# Patient Record
Sex: FEMALE | Race: WHITE | Employment: FULL TIME | URBAN - METROPOLITAN AREA
[De-identification: names, ages, dates, MRNs, and addresses within clinical notes are randomized per-mention and may not be internally consistent; named-entity substitution may affect disease eponyms.]

---

## 2018-02-12 PROBLEM — L03.114 CELLULITIS OF LEFT HAND: Status: ACTIVE | Noted: 2018-02-12
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
## 2018-02-14 PROBLEM — L03.114 CELLULITIS OF LEFT HAND: Status: RESOLVED | Noted: 2018-02-12 | Resolved: 2018-02-14

## 2018-10-02 ENCOUNTER — OFFICE VISIT (OUTPATIENT)
Dept: ORTHOPEDIC SURGERY | Age: 28
End: 2018-10-02
Payer: COMMERCIAL

## 2018-10-02 VITALS
SYSTOLIC BLOOD PRESSURE: 121 MMHG | WEIGHT: 125 LBS | DIASTOLIC BLOOD PRESSURE: 76 MMHG | HEIGHT: 66 IN | BODY MASS INDEX: 20.09 KG/M2

## 2018-10-02 DIAGNOSIS — M25.562 LEFT KNEE PAIN, UNSPECIFIED CHRONICITY: ICD-10-CM

## 2018-10-02 DIAGNOSIS — E88.89 HOFFA'S SYNDROME (HCC): Primary | ICD-10-CM

## 2018-10-02 DIAGNOSIS — M22.2X2 PATELLOFEMORAL PAIN SYNDROME OF LEFT KNEE: ICD-10-CM

## 2018-10-02 PROCEDURE — 99203 OFFICE O/P NEW LOW 30 MIN: CPT | Performed by: ORTHOPAEDIC SURGERY

## 2018-10-02 RX ORDER — METRONIDAZOLE 375 MG/1
375 CAPSULE ORAL 2 TIMES DAILY
COMMUNITY

## 2018-10-22 ENCOUNTER — HOSPITAL ENCOUNTER (OUTPATIENT)
Dept: PHYSICAL THERAPY | Age: 28
Setting detail: THERAPIES SERIES
Discharge: HOME | End: 2018-10-22
Payer: COMMERCIAL

## 2018-10-22 PROCEDURE — 97161 PT EVAL LOW COMPLEX 20 MIN: CPT | Performed by: PHYSICAL THERAPIST

## 2018-10-22 PROCEDURE — G8978 MOBILITY CURRENT STATUS: HCPCS | Performed by: PHYSICAL THERAPIST

## 2018-10-22 PROCEDURE — 97530 THERAPEUTIC ACTIVITIES: CPT | Performed by: PHYSICAL THERAPIST

## 2018-10-22 PROCEDURE — G8979 MOBILITY GOAL STATUS: HCPCS | Performed by: PHYSICAL THERAPIST

## 2018-10-22 PROCEDURE — 97110 THERAPEUTIC EXERCISES: CPT | Performed by: PHYSICAL THERAPIST

## 2018-10-22 NOTE — FLOWSHEET NOTE
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 ([facility] [address]  · Treatment Diagnosis: M25.562- left knee pain  Insurance/Certification information:  PT Insurance Information: [place]  Physician Information:  Referring Practitioner: Thanh Martinez of care signed (Y/N):     Date of Patient follow up with Physician: prn after 2018    G-Code (if applicable):      Date G-Code Applied:  10/22/2018  PT G-Codes  Functional Assessment Tool Used: LEFS  Score: 75%  Functional Limitation: Mobility: Walking and moving around  Mobility: Walking and Moving Around Current Status ():  At least 20 percent but less than 40 percent impaired, limited or restricted  Mobility: Walking and Moving Around Goal Status (): 0 percent impaired, limited or restricted    Progress Note: [x]  Yes  []  No  Next due by: Visit #10  Or 2018     Latex Allergy:  [x]NO      []YES  Preferred Language for Healthcare:   [x]English       []other:    Visit # Insurance Allowable   1 30     Pain level:  See eval     SUBJECTIVE:  See eval    OBJECTIVE: See eval  Observation:   Test measurements:      Flexibility L R Comment   Hamstring      Gastroc      ITB      Quad                ROM PROM AROM Overpressure Comment    L R L R L R    Flexion          Extension                                  Strength L R Comment   Quad      Hamstring      Gastroc      Hip flexor      Hip ABD                      Special Test Results/Comment   Meniscal Click    Crepitus    Flexion Test    Valgus Laxity    Varus Laxity    Lachmans    Drop Back    Homans Hold pending MD visit [] Discharge    Electronically signed by: JAELYN Ortiz [id number]

## 2018-10-22 NOTE — PLAN OF CARE
Relevant Medical History: Chrons, asthma- EIA uses inhaler, hernia- managed  Functional Disability Index:  LEFS    Pain Scale: 2/10  Easing factors: KT tape, tried orthotics, ibf, ice with some relief. Used an old brace with soccer, which helped. Pain at best- 0/10  Provocative factors: soccer, running, hiking, walking around classroom. Pain at worst-8-9/10    Type: []Constant   [x]Intermittent  []Radiating []Localized []other:     Numbness/Tingling:  MTHFR- blood disorder. Possibly some from this. 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 Trains with a friend  Pace: recovery run 10:10, 5K race pace 8:00  Training Schedule: did full Pig in May, walked [facility] due to injury  Terrain: hilly, avoids treadmill  [place]itel training: class on Monday night-cardio with strengthening, yoga, Pilates, cardio at the gym ([employer]), lifting  Upcoming Events: holding per MD visit  Footwear:  Did foot ID at [employer]. She got insoles as her left foot pronates more. She got pain around the time too. Normal Menstrual Cycles: [] yes   [x] no   [] N/A    Objective Data:    Joint mobility:    [x]Normal    []Hypo   []Hyper    Palpation: TTP over patellar tendon, tibial tuberosity, proximal ITB, -on jt line.

## 2018-10-24 ENCOUNTER — HOSPITAL ENCOUNTER (OUTPATIENT)
Dept: PHYSICAL THERAPY | Age: 28
Setting detail: THERAPIES SERIES
Discharge: HOME | End: 2018-10-24
Payer: COMMERCIAL

## 2018-10-24 PROCEDURE — 97110 THERAPEUTIC EXERCISES: CPT | Performed by: PHYSICAL THERAPIST

## 2018-10-24 PROCEDURE — 97530 THERAPEUTIC ACTIVITIES: CPT | Performed by: PHYSICAL THERAPIST

## 2018-10-24 NOTE — FLOWSHEET NOTE
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 ([facility] [address]  · Treatment Diagnosis: M25.562- left knee pain  Insurance/Certification information:  PT Insurance Information: [place]  Physician Information:  Referring Practitioner: Edna Weeks of care signed (Y/N):     Date of Patient follow up with Physician: prn after 2018    G-Code (if applicable):      Date G-Code Applied:  10/22/2018       Progress Note: [x]  Yes  []  No  Next due by: Visit #10  Or 2018     Latex Allergy:  [x]NO      []YES  Preferred Language for Healthcare:   [x]English       []other:    Visit # Insurance Allowable   2 30     Pain level:  1/10     SUBJECTIVE:  Her HEP is going well. She felt she was tired after doing the exercises twice a day. She feels fatigued with this. She does still get some tingling in her toes with ITB stretching.       OBJECTIVE: See eval  Observation:   Test measurements:      Flexibility L R Comment   Hamstring      Gastroc      ITB      Quad                ROM PROM AROM Overpressure Comment    L R L R L R    Flexion          Extension                                  Strength L R Comment   Quad      Hamstring      Gastroc      Hip flexor      Hip ABD                      Special Test Results/Comment   Meniscal Click    Crepitus    Flexion Test    Valgus Laxity    Varus Laxity    Lachmans    Drop Back    Homans            Girth L R   Mid Patella     Suprapatellar     5cm above     15cm above       Wells Clinical Decision Rule for DVT    Clinical Presentation  Possible Score  Clients Score    Active cancer (within 6 months of diagnosis or receiving palliative care)  1     Paralysis, paresis, or recent immobilization of lower extremity  1     Bedridden for more than 3 days or major surgery in the last 4 weeks  1     Localized tenderness in the center of the posterior calf, the popliteal space, or along the femoral vein in the anterior thigh/groin  1     Entire lower extremity swelling  1     Unilateral calf swelling (more than 3 cm larger than uninvolved side)  1     Unilateral pitting edema  1     Collateral superficial veins (nonvaricose)  1     An alternative diagnosis is as likely (or more likely) than DVT (e.g., cellulitis, postoperative swelling, calf strain)  -2     Total Points   -2     Key  · -2 to 0 Low probability of DVT 3% (95% confidence interval [CI] 1.7%-5.9%)  · 1 to 2 Moderate probability of DVT 17% (95% confidence interval [CI] 12%-23%)  · 3 or more High probability of DVT 75% (95% confidence interval [CI] 63%-84%)    Medical consultation is advised in the presence of low probability  Medical referral is required with moderate or high score. Angel PS, Hector DR, Jyothi J, et al: Value of assessment of pretest probability of deep-vein thrombosis in clinical management, Lancet 991:5280-8000, 1997.       RESTRICTIONS/PRECAUTIONS: Chrons, asthma- EIA uses inhaler, hernia- managed; MTHFR- blood disorder     Exercises/Interventions:     Exercise/Equipment Resistance/Repetitions Other comments   Stretching     Hamstring 4x:30 Stopped early due to paresthesias in foot   Hip Flexion 5x:30 Off table   ITB 5x:30 Cross body   Grion     Quad     Inclined Calf     Towel Pull     Foam roll over ITB 2' to pt trevon On less dense roller             SLR     Supine 3x10    Prone     Abduction 3x10    Adducton 3x10    SLR+ 3x:15    Clams 3x10 4#         Hip ABD circuit 3x  :30 hold  30x pumps         Isometrics     Quad sets     Hip Adduction     Hamstring                    Patellar Glides     Medial Superior     Inferior          ROM     Sheet Pulls     Wall Slides     Edge of bed     Flexionator     Extensionator     Hang Weights     Ankle Pumps                              CKC     Calf raises     Wall sits 5x:30     Step ups     Step up and over     Lateral Step Downs               Mini squats     CC TKE          Lateral band walks 3 laps red    Side Planks     Half moon     Single leg flow          Biodex-balance     Single leg stance     Plyoback          Stool scoots     SB bridge     SB HS Curls     Planks          PRE     Extension 3x10 30# ECL RANGE: 90/40   Flexion  RANGE: 0/90        Cable Column          Leg Press 3x10 80# ECL RANGE: 70/10        Bike     Treadmill                     Therapeutic Exercise and NMR EXR  [x] (36432) Provided verbal/tactile cueing for activities related to strengthening, flexibility, endurance, ROM for improvements in LE, proximal hip, and core control with self care, mobility, lifting, ambulation.  [] (67664) Provided verbal/tactile cueing for activities related to improving balance, coordination, kinesthetic sense, posture, motor skill, proprioception  to assist with LE, proximal hip, and core control in self care, mobility, lifting, ambulation and eccentric single leg control.      NMR and Therapeutic Activities:    [x] (79475 or 21640) Provided verbal/tactile cueing for activities related to improving balance, coordination, kinesthetic sense, posture, motor skill, proprioception and motor activation to allow for proper function of core, proximal hip and LE with self care and ADLs  [] (23279) Gait Re-education- Provided training and instruction to the patient for proper LE, core and proximal hip recruitment and positioning and eccentric body weight control with ambulation re-education including up and down stairs     Home Exercise Program:    [x] (76652) Reviewed/Progressed HEP activities related to strengthening, flexibility, endurance, ROM of core, proximal hip and LE for functional self-care, mobility, lifting and ambulation/stair navigation   [] (32965)Reviewed/Progressed HEP activities related to improving balance, coordination, kinesthetic sense, posture, motor skill, proprioception of core, proximal hip and LE for self care, mobility, lifting, and ambulation/stair navigation      Manual Treatments:  PROM / STM / Oscillations-Mobs:  G-I, II, III, IV (PA's, Inf., Post.)  [] (71868) Provided manual therapy to mobilize LE, proximal hip and/or LS spine soft tissue/joints for the purpose of modulating pain, promoting relaxation,  increasing ROM, reducing/eliminating soft tissue swelling/inflammation/restriction, improving soft tissue extensibility and allowing for proper ROM for normal function with self care, mobility, lifting and ambulation. Other:        Modalities:  Ice-to go    Charges:   Timed Code Treatment Minutes: 40'   Total Treatment Minutes: 72'       [] EVAL (LOW) 455 1011   [] EVAL (MOD) 07341   [] EVAL (HIGH) 55165   [] RE-EVAL   [x] FREDI(83569) x  2   [] IONTO  [] NMR (69878) x      [] VASO  [] Manual (53690) x       [] Other:  [x] TA x  1    [] Mech Traction (93355)  [] ES(attended) (61546)      [] ES (un) (89087):       GOALS:  Patient stated goal: return to running, hiking, walking and soccer without c/o pain    Therapist goals for Patient:   Short Term Goals: To be achieved in: 2 weeks  1. Pt will be independent HEP and progression per patient tolerance, in order to prevent re-injury. 2. Patient will have a decrease in pain of 7/10 at worst to facilitate improvement in movement, function, and ADLs as indicated by functional deficits. Long Term Goals: To be achieved in: 6 weeks  1. Pt will demo a LEFS score of 85% or better to assist with reaching prior level of function. 2. Patient will demonstrate an increase in strength of hip flex, ABD, and knee flex/ext to 4+*/5 to allow for proper functional mobility as indicated by patients functional deficits.

## 2018-10-29 ENCOUNTER — HOSPITAL ENCOUNTER (OUTPATIENT)
Dept: PHYSICAL THERAPY | Age: 28
Setting detail: THERAPIES SERIES
Discharge: HOME | End: 2018-10-29
Payer: COMMERCIAL

## 2018-10-29 PROCEDURE — 97530 THERAPEUTIC ACTIVITIES: CPT | Performed by: PHYSICAL THERAPIST

## 2018-10-29 PROCEDURE — 97110 THERAPEUTIC EXERCISES: CPT | Performed by: PHYSICAL THERAPIST

## 2018-10-29 NOTE — FLOWSHEET NOTE
tolerance, in order to prevent re-injury. 2. Patient will have a decrease in pain of 7/10 at worst to facilitate improvement in movement, function, and ADLs as indicated by functional deficits. Long Term Goals: To be achieved in: 6 weeks  1. Pt will demo a LEFS score of 85% or better to assist with reaching prior level of function. 2. Patient will demonstrate an increase in strength of hip flex, ABD, and knee flex/ext to 4+*/5 to allow for proper functional mobility as indicated by patients functional deficits. 3. Patient will return to amb in the community without c/o pain. 4. Pt will return to running/soccer/hiking without c/o pain. 5. Pt will report pain at worst less than or equal to 2/10. Progression Towards Functional goals:  [] Patient is progressing as expected towards functional goals listed. [] Progression is slowed due to complexities listed. [] Progression has been slowed due to co-morbidities. [x] Plan just implemented, too soon to assess goals progression  [] Other:     ASSESSMENT:      Treatment/Activity Tolerance:  [x] Patient tolerated treatment well [] Patient limited by fatigue  [] Patient limited by pain  [] Patient limited by other medical complications  [x] Other: Pt tolerated tx well. Pt ran on the AlterG but still had reproduction of symptoms with deceleration and then more persistent symptoms after 6' even when percent offloaded was increased. Pt did not perform ABD circuit or 3 sets of SLR d/t time restraints and good adherence of HEP at home. Pt would benefit from continued PT to improve strength and flexibility to return to running.      Prognosis: [x] Good [] Fair  [] Poor    Patient Requires Follow-up: [x] Yes  [] No    PLAN: See eval.  Continue progressing strengthening exercises  [] Continue per plan of care [] Alter current plan (see comments)  [x] Plan of care initiated [] Hold pending MD visit [] Discharge    Electronically signed by:  David Mcgill

## 2018-10-31 ENCOUNTER — HOSPITAL ENCOUNTER (OUTPATIENT)
Dept: PHYSICAL THERAPY | Age: 28
Setting detail: THERAPIES SERIES
Discharge: HOME | End: 2018-10-31
Payer: COMMERCIAL

## 2018-10-31 PROCEDURE — 97110 THERAPEUTIC EXERCISES: CPT | Performed by: PHYSICAL THERAPIST

## 2018-10-31 PROCEDURE — 97112 NEUROMUSCULAR REEDUCATION: CPT | Performed by: PHYSICAL THERAPIST

## 2018-10-31 PROCEDURE — 97530 THERAPEUTIC ACTIVITIES: CPT | Performed by: PHYSICAL THERAPIST

## 2018-10-31 NOTE — FLOWSHEET NOTE
Marg[address], [facility] [address] EVELYN SALDANA                                                         Physical Therapy Daily Treatment Note  Date:  10/31/2018    Patient Name:  Petty Hopson    :  1990  MRN: [id number]    Medical/Treatment Diagnosis Information:  · Diagnosis: M22.2X2 (ICD-10-CM) - Patellofemoral pain syndrome of left knee; E88.89 (ICD-10-CM) - [facility] ([facility]  · Treatment Diagnosis: M25.562- left knee pain  Insurance/Certification information:  PT Insurance Information: [place]  Physician Information:  Referring Practitioner: Opal Mcneal of care signed (Y/N):     Date of Patient follow up with Physician: prn after 2018    G-Code (if applicable):      Date G-Code Applied:  10/22/2018    PT G-Codes  Functional Assessment Tool Used: LEFS  Score: 75%  Functional Limitation: Mobility: Walking and moving around  Mobility: Walking and Moving Around Current Status (): At least 20 percent but less than 40 percent impaired, limited or restricted  Mobility: Walking and Moving Around Goal Status (): 0 percent impaired, limited or restricted    Progress Note: []  Yes  [x]  No  Next due by: Visit #10  Or 2018     Latex Allergy:  [x]NO      []YES  Preferred Language for Healthcare:   [x]English       []other:    Visit # Insurance Allowable   4 30     Pain level:  1/10     SUBJECTIVE:  Pt reports that she is feeling kind of sore today. She reports that that her knee feels like it stiffens up when she goes into the hamstring stretch with a straight knee and that there is pulling in the front of her knee with the hip flexor stretch. She states that most of the soreness is in the medial inferior portion of her patella but occasionally spreads to the inferior lateral portion. Pt reports that she frequently gets LBP with exercise.     OBJECTIVE: See eval  Observation:   Test measurements:      Flexibility L R Comment   Hamstring      Gastroc      ITB      Quad                ROM PROM AROM Overpressure Comment    L R L R L R    Flexion          Extension                                  Strength L R Comment   Quad      Hamstring      Gastroc      Hip flexor      Hip ABD                      Special Test Results/Comment   Meniscal Click    Crepitus    Flexion Test    Valgus Laxity    Varus Laxity    Lachmans    Drop Back    Homans            Girth L R   Mid Patella     Suprapatellar     5cm above     15cm above       Angel Clinical Decision Rule for DVT    Clinical Presentation  Possible Score  Clients Score    Active cancer (within 6 months of diagnosis or receiving palliative care)  1     Paralysis, paresis, or recent immobilization of lower extremity  1     Bedridden for more than 3 days or major surgery in the last 4 weeks  1     Localized tenderness in the center of the posterior calf, the popliteal space, or along the femoral vein in the anterior thigh/groin  1     Entire lower extremity swelling  1     Unilateral calf swelling (more than 3 cm larger than uninvolved side)  1     Unilateral pitting edema  1     Collateral superficial veins (nonvaricose)  1     An alternative diagnosis is as likely (or more likely) than DVT (e.g., cellulitis, postoperative swelling, calf strain)  -2     Total Points   -2     Key  · -2 to 0 Low probability of DVT 3% (95% confidence interval [CI] 1.7%-5.9%)  · 1 to 2 Moderate probability of DVT 17% (95% confidence interval [CI] 12%-23%)  · 3 or more High probability of DVT 75% (95% confidence interval [CI] 63%-84%)    Medical consultation is advised in the presence of low probability  Medical referral is required with moderate or high score. Angel PS, Hector DR, Jyothi J, et al: Value of assessment of pretest probability of deep-vein thrombosis in clinical management, Lancet 191:5737-5009, 1997.       RESTRICTIONS/PRECAUTIONS: Chrons, asthma- EIA uses inhaler, hernia- self care, mobility, lifting, ambulation and eccentric single leg control. NMR and Therapeutic Activities:    [x] (54123 or 99426) Provided verbal/tactile cueing for activities related to improving balance, coordination, kinesthetic sense, posture, motor skill, proprioception and motor activation to allow for proper function of core, proximal hip and LE with self care and ADLs  [] (03999) Gait Re-education- Provided training and instruction to the patient for proper LE, core and proximal hip recruitment and positioning and eccentric body weight control with ambulation re-education including up and down stairs     Home Exercise Program:    [x] (13928) Reviewed/Progressed HEP activities related to strengthening, flexibility, endurance, ROM of core, proximal hip and LE for functional self-care, mobility, lifting and ambulation/stair navigation   [] (78952)Reviewed/Progressed HEP activities related to improving balance, coordination, kinesthetic sense, posture, motor skill, proprioception of core, proximal hip and LE for self care, mobility, lifting, and ambulation/stair navigation      Manual Treatments:  PROM / STM / Oscillations-Mobs:  G-I, II, III, IV (PA's, Inf., Post.)  [] (75472) Provided manual therapy to mobilize LE, proximal hip and/or LS spine soft tissue/joints for the purpose of modulating pain, promoting relaxation,  increasing ROM, reducing/eliminating soft tissue swelling/inflammation/restriction, improving soft tissue extensibility and allowing for proper ROM for normal function with self care, mobility, lifting and ambulation.      Other:        Modalities:  Declined    Charges:   Timed Code Treatment Minutes: 40'   Total Treatment Minutes: 54'       [] EVAL (LOW) 455 1011   [] EVAL (MOD) 63727   [] EVAL (HIGH) 75109   [] RE-EVAL   [x] SE(98508) x  1   [] IONTO  [x] NMR (76950) x  1   [] VASO  [] Manual (74058) x       [] Other:  [x] TA x  1    [] Mech Traction (69384)  [] ES(attended) (83858)      [] ES (un) (99855):       GOALS:  Patient stated goal: return to running, hiking, walking and soccer without c/o pain    Therapist goals for Patient:   Short Term Goals: To be achieved in: 2 weeks  1. Pt will be independent HEP and progression per patient tolerance, in order to prevent re-injury. 2. Patient will have a decrease in pain of 7/10 at worst to facilitate improvement in movement, function, and ADLs as indicated by functional deficits. Long Term Goals: To be achieved in: 6 weeks  1. Pt will demo a LEFS score of 85% or better to assist with reaching prior level of function. 2. Patient will demonstrate an increase in strength of hip flex, ABD, and knee flex/ext to 4+*/5 to allow for proper functional mobility as indicated by patients functional deficits. 3. Patient will return to amb in the community without c/o pain. 4. Pt will return to running/soccer/hiking without c/o pain. 5. Pt will report pain at worst less than or equal to 2/10. Progression Towards Functional goals:  [] Patient is progressing as expected towards functional goals listed. [] Progression is slowed due to complexities listed. [] Progression has been slowed due to co-morbidities. [x] Plan just implemented, too soon to assess goals progression  [] Other:     ASSESSMENT:      Treatment/Activity Tolerance:  [x] Patient tolerated treatment well [] Patient limited by fatigue  [] Patient limited by pain  [] Patient limited by other medical complications  [x] Other: Pt tolerated tx well. Infrapatellar fat pad Staley taping was utilized during today's session. Pt reported feeling better during the exercises and less symptoms after the session. 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 Pt was educated to monitor symptoms with tape on and off until next visit so we can decide if it is a treatment method she would like to continue.   Pt was able

## 2018-11-05 ENCOUNTER — HOSPITAL ENCOUNTER (OUTPATIENT)
Dept: PHYSICAL THERAPY | Age: 28
Setting detail: THERAPIES SERIES
Discharge: HOME | End: 2018-11-05
Payer: COMMERCIAL

## 2018-11-05 PROCEDURE — 97530 THERAPEUTIC ACTIVITIES: CPT | Performed by: PHYSICAL THERAPIST

## 2018-11-05 PROCEDURE — 97110 THERAPEUTIC EXERCISES: CPT | Performed by: PHYSICAL THERAPIST

## 2018-11-05 PROCEDURE — 97112 NEUROMUSCULAR REEDUCATION: CPT | Performed by: PHYSICAL THERAPIST

## 2018-11-12 ENCOUNTER — HOSPITAL ENCOUNTER (OUTPATIENT)
Dept: PHYSICAL THERAPY | Age: 28
Setting detail: THERAPIES SERIES
Discharge: HOME | End: 2018-11-12
Payer: COMMERCIAL

## 2018-11-12 PROCEDURE — 97110 THERAPEUTIC EXERCISES: CPT | Performed by: PHYSICAL THERAPIST

## 2018-11-12 PROCEDURE — 97140 MANUAL THERAPY 1/> REGIONS: CPT | Performed by: PHYSICAL THERAPIST

## 2018-11-12 PROCEDURE — 97530 THERAPEUTIC ACTIVITIES: CPT | Performed by: PHYSICAL THERAPIST

## 2018-11-12 NOTE — FLOWSHEET NOTE
without c/o pain    Therapist goals for Patient:   Short Term Goals: To be achieved in: 2 weeks  1. Pt will be independent HEP and progression per patient tolerance, in order to prevent re-injury. 2. Patient will have a decrease in pain of 7/10 at worst to facilitate improvement in movement, function, and ADLs as indicated by functional deficits. Long Term Goals: To be achieved in: 6 weeks  1. Pt will demo a LEFS score of 85% or better to assist with reaching prior level of function. 2. Patient will demonstrate an increase in strength of hip flex, ABD, and knee flex/ext to 4+*/5 to allow for proper functional mobility as indicated by patients functional deficits. 3. Patient will return to amb in the community without c/o pain. 4. Pt will return to running/soccer/hiking without c/o pain. 5. Pt will report pain at worst less than or equal to 2/10. Progression Towards Functional goals:  [] Patient is progressing as expected towards functional goals listed. [] Progression is slowed due to complexities listed. [] Progression has been slowed due to co-morbidities. [x] Plan just implemented, too soon to assess goals progression  [] Other:     ASSESSMENT:      Treatment/Activity Tolerance:  [x] Patient tolerated treatment well [] Patient limited by fatigue  [] Patient limited by pain  [] Patient limited by other medical complications  [x] Other: Pt tolerated tx well. She was instructed to hold on the taping for now until getting her medial knee pain to subside. She was not TTP over/around patellar tendon. She was more TTP over pes. She did report liking the Stick over ITB. She trevon progressions well and was challenged by the SLR + with focusing on terminal knee ext and hip ABD circuit. Pt would continue to benefit from skilled PT to improve strength, endurance, and flexibility.       Prognosis: [x] Good [] Fair  [] Poor    Patient Requires Follow-up: [x] Yes  [] No    PLAN: Continue progressing

## 2018-11-14 ENCOUNTER — HOSPITAL ENCOUNTER (OUTPATIENT)
Dept: PHYSICAL THERAPY | Age: 28
Setting detail: THERAPIES SERIES
Discharge: HOME | End: 2018-11-14
Payer: COMMERCIAL

## 2018-11-14 PROCEDURE — 97110 THERAPEUTIC EXERCISES: CPT | Performed by: PHYSICAL THERAPIST

## 2018-11-14 PROCEDURE — 97140 MANUAL THERAPY 1/> REGIONS: CPT | Performed by: PHYSICAL THERAPIST

## 2018-11-14 PROCEDURE — 97530 THERAPEUTIC ACTIVITIES: CPT | Performed by: PHYSICAL THERAPIST

## 2018-11-14 NOTE — FLOWSHEET NOTE
blood disorder     Exercises/Interventions:     Exercise/Equipment Resistance/Repetitions Other comments   Stretching     Hamstring 5x:30    Hip Flexion 5x:30 Off table   ITB 5x:30 Cross body   Grion     Quad     Inclined Calf 5x:30    Towel Pull 5x:30  with hamstring stretch and propped   Foam roll over ITB  On less dense roller             SLR     Supine 3x10 4#    Prone 3x10 1# Leaning over table   Abduction 3x10 4#    Adducton 3x10 4#    SLR+ 3x:30#    Clams 3x10 8#         Hip ABD circuit 3x  :30 hold  30x pumps  1x ABC         Isometrics     Quad sets     Hip Adduction     Hamstring                    Patellar Glides     Medial     Superior     Inferior          ROM     Sheet Pulls     Wall Slides     Edge of bed     Flexionator     Extensionator     Hang Weights     Ankle Pumps                              CKC     Calf raises     Wall sits circuit  5  x:30   1 lap band walks (blue)    Step ups     Step up and over     Lateral Step Downs               Mini squats     CC TKE 3x10 5 pl         Lateral band walks     Side Planks     Half moon     Single leg flow          Biodex-balance     Single leg stance     Plyoback          Stool scoots     SB bridge     SB HS Curls     Planks     RDL 2x10 SL 5# (NV 3x10)                   PRE     Extension  RANGE: 90/40   Flexion 1x10 30# ECL RANGE: 0/90        Cable Column          Leg Press  RANGE: 70/10        Bike     Treadmill     AlterG  M blue shorts  Stopped early d/t pain        Manual/taping STM with The Stick over ITB  And cross-friction massage over infrapatellar fat pad petecchia noted, but not adverse effects.               Therapeutic Exercise and NMR EXR  [x] (50427) Provided verbal/tactile cueing for activities related to strengthening, flexibility, endurance, ROM for improvements in LE, proximal hip, and core control with self care, mobility, lifting, ambulation.  [] (78503) Provided verbal/tactile cueing for activities related to improving balance,

## 2018-11-26 ENCOUNTER — HOSPITAL ENCOUNTER (OUTPATIENT)
Dept: PHYSICAL THERAPY | Age: 28
Setting detail: THERAPIES SERIES
Discharge: HOME | End: 2018-11-26
Payer: COMMERCIAL

## 2018-11-26 PROCEDURE — G8979 MOBILITY GOAL STATUS: HCPCS | Performed by: PHYSICAL THERAPIST

## 2018-11-26 PROCEDURE — G8978 MOBILITY CURRENT STATUS: HCPCS | Performed by: PHYSICAL THERAPIST

## 2018-11-26 PROCEDURE — 97140 MANUAL THERAPY 1/> REGIONS: CPT | Performed by: PHYSICAL THERAPIST

## 2018-11-26 PROCEDURE — 97530 THERAPEUTIC ACTIVITIES: CPT | Performed by: PHYSICAL THERAPIST

## 2018-11-26 PROCEDURE — 97110 THERAPEUTIC EXERCISES: CPT | Performed by: PHYSICAL THERAPIST

## 2018-11-26 NOTE — FLOWSHEET NOTE
[place] [address], [facility] DR EVELYN SALDANA            Physical Therapy Re-Certification Plan of Care    Dear Dr. Lili Quintana,    We had the pleasure of treating the following patient for physical therapy services at [address] [place] [address]. A summary of our findings can be found in the updated assessment below. This includes our plan of care. If you have any questions or concerns regarding these findings, please do not hesitate to contact me at the office phone number checked above. Thank you for the referral.     Physician Signature:________________________________Date:__________________  By signing above (or electronic signature), therapists plan is approved by physician    Date Range Of Visits: 10/22/18-11/26/2018  Total Visits to Date: 8  Overall Response to Treatment:   []Patient is responding well to treatment and improvement is noted with regards  to goals   []Patient should continue to improve in reasonable time if they continue HEP   []Patient has plateaued and is no longer responding to skilled PT intervention    []Patient is getting worse and would benefit from return to referring MD   []Patient unable to adhere to initial POC   [x]Other: Pt tolerated tx session well. Pt has had small gains in strength but continues to have hip abduction weakness. Pt continues to complain of pain and discomfort with activities. Pt feels as though she has improved, she feels stuck at this point. Additionally, despite reporting getting better overall her LEFS score has decreased. Different interventions have been implemented such as taping, cross-friction massage and strengthening with a focus on hip ABD. Pt ran on the AlterG again this session and reported pinching while at 80% that went away when she went to 75%, but she was still achy after running for a total of 3 mins.   Pt was educated to focus on hip abductor strengthening 1997.      RESTRICTIONS/PRECAUTIONS: Chrons, asthma- EIA uses inhaler, hernia- managed; MTHFR- blood disorder     Exercises/Interventions:     Exercise/Equipment Resistance/Repetitions Other comments   Stretching     Hamstring     Hip Flexion 5x:30 kneeling   ITB 5x:30 Cross body   Grion     Quad     Inclined Calf 5x:30    Towel Pull 5x:30  with hamstring stretch and propped   Foam roll over ITB  On less dense roller             SLR     Supine 3x10 4#    Prone 3x10 1# Leaning over table   Abduction 3x10 4#    Adducton 3x10 4#    SLR+     Clams 3x10 8#         Hip ABD circuit          Isometrics     Quad sets     Hip Adduction     Hamstring                    Patellar Glides     Medial     Superior     Inferior          ROM     Sheet Pulls     Wall Slides     Edge of bed     Flexionator     Extensionator     Hang Weights     Ankle Pumps                              CKC     Calf raises     Wall sits circuit  4  x:30   1 lap band walks (blue) 5th held d/t time   Step ups     Step up and over     Lateral Step Downs               Mini squats     CC TKE          Lateral band walks     Side Planks     Half moon     Single leg flow          Biodex-balance     Single leg stance     Plyoback          Stool scoots     SB bridge     SB HS Curls     Planks     RDL                    PRE     Extension  RANGE: 90/40   Flexion  RANGE: 0/90        Cable Column          Leg Press  RANGE: 70/10        Bike     Treadmill     AlterG 9' 80% 2' walk/1' run down to 75% for 2nd two runs  1' walk 90%  1' walk 100% M blue shorts  Stopped early d/t pain        Manual/taping STM with The Stick over ITB  And cross-friction massage over infrapatellar fat pad petecchia noted, but not adverse effects.               Therapeutic Exercise and NMR EXR  [x] (51479) Provided verbal/tactile cueing for activities related to strengthening, flexibility, endurance, ROM for improvements in LE, proximal hip, and core control with self care, mobility, lifting, ambulation.  [] (62506) Provided verbal/tactile cueing for activities related to improving balance, coordination, kinesthetic sense, posture, motor skill, proprioception  to assist with LE, proximal hip, and core control in self care, mobility, lifting, ambulation and eccentric single leg control. NMR and Therapeutic Activities:    [x] (22020 or 34900) Provided verbal/tactile cueing for activities related to improving balance, coordination, kinesthetic sense, posture, motor skill, proprioception and motor activation to allow for proper function of core, proximal hip and LE with self care and ADLs  [] (19977) Gait Re-education- Provided training and instruction to the patient for proper LE, core and proximal hip recruitment and positioning and eccentric body weight control with ambulation re-education including up and down stairs     Home Exercise Program:    [x] (86527) Reviewed/Progressed HEP activities related to strengthening, flexibility, endurance, ROM of core, proximal hip and LE for functional self-care, mobility, lifting and ambulation/stair navigation   [] (93625)Reviewed/Progressed HEP activities related to improving balance, coordination, kinesthetic sense, posture, motor skill, proprioception of core, proximal hip and LE for self care, mobility, lifting, and ambulation/stair navigation      Manual Treatments:  PROM / STM / Oscillations-Mobs:  G-I, II, III, IV (PA's, Inf., Post.)  [] (10491) Provided manual therapy to mobilize LE, proximal hip and/or LS spine soft tissue/joints for the purpose of modulating pain, promoting relaxation,  increasing ROM, reducing/eliminating soft tissue swelling/inflammation/restriction, improving soft tissue extensibility and allowing for proper ROM for normal function with self care, mobility, lifting and ambulation.      Other:        Modalities:  Declined    Charges:    Timed Code Treatment Minutes: 40'   Total Treatment Minutes: 61'       [] HUNG (LOW) [phone]   [] Pt feels as though she has improved, she feels stuck at this point. Additionally, despite reporting getting better overall her LEFS score has decreased. Different interventions have been implemented such as taping, cross-friction massage and strengthening with a focus on hip ABD. Pt ran on the AlterG again this session and reported pinching while at 80% that went away when she went to 75%, but she was still achy after running for a total of 3 mins. Pt was educated to focus on hip abductor strengthening at home and to schedule a visit with the MD to reassess her status. Prognosis: [x] Good [] Fair  [] Poor    Patient Requires Follow-up: [x] Yes  [] No    PLAN: Continue progressing strengthening exercises. [x] Continue per plan of care [] Alter current plan (see comments)  [] Plan of care initiated [] Hold pending MD visit [] Discharge    Electronically signed by:  Nabeel Moseley, Student Physical Therapist  Therapist was present, directed the patient's care, made skilled judgement, and was responsible for assessment and treatment of the patient.       JAELYN Ortiz [id number]

## 2018-11-28 ENCOUNTER — HOSPITAL ENCOUNTER (OUTPATIENT)
Dept: PHYSICAL THERAPY | Age: 28
Setting detail: THERAPIES SERIES
Discharge: HOME | End: 2018-11-28
Payer: COMMERCIAL

## 2018-11-28 PROCEDURE — 97530 THERAPEUTIC ACTIVITIES: CPT | Performed by: PHYSICAL THERAPIST

## 2018-11-28 PROCEDURE — 97110 THERAPEUTIC EXERCISES: CPT | Performed by: PHYSICAL THERAPIST

## 2018-11-28 PROCEDURE — 97140 MANUAL THERAPY 1/> REGIONS: CPT | Performed by: PHYSICAL THERAPIST

## 2018-11-28 NOTE — FLOWSHEET NOTE
with . Test measurements:      Flexibility L R Comment   Hamstring 70 65 SLR   Gastroc      ITB      Quad                ROM PROM AROM Overpressure Comment    L R L R L R    Flexion   153       Extension   1 hyper                               Strength L R Comment   Quad 5     Hamstring 4+      Gastroc      Hip flexor 4+     Hip ABD 4                      Special Test Results/Comment   Meniscal Click    Crepitus    Flexion Test    Valgus Laxity    Varus Laxity    Lachmans    Drop Back    Homans            Girth L R   Mid Patella     Suprapatellar     5cm above     15cm above       Angel Clinical Decision Rule for DVT    Clinical Presentation  Possible Score  Clients Score    Active cancer (within 6 months of diagnosis or receiving palliative care)  1     Paralysis, paresis, or recent immobilization of lower extremity  1     Bedridden for more than 3 days or major surgery in the last 4 weeks  1     Localized tenderness in the center of the posterior calf, the popliteal space, or along the femoral vein in the anterior thigh/groin  1     Entire lower extremity swelling  1     Unilateral calf swelling (more than 3 cm larger than uninvolved side)  1     Unilateral pitting edema  1     Collateral superficial veins (nonvaricose)  1     An alternative diagnosis is as likely (or more likely) than DVT (e.g., cellulitis, postoperative swelling, calf strain)  -2     Total Points   -2     Key  · -2 to 0 Low probability of DVT 3% (95% confidence interval [CI] 1.7%-5.9%)  · 1 to 2 Moderate probability of DVT 17% (95% confidence interval [CI] 12%-23%)  · 3 or more High probability of DVT 75% (95% confidence interval [CI] 63%-84%)    Medical consultation is advised in the presence of low probability  Medical referral is required with moderate or high score.       Angel PS, Hector JENKINS, Jyothi J, et al: Value of assessment of pretest probability of deep-vein thrombosis in clinical management, Lancet [id number], cueing for activities related to improving balance, coordination, kinesthetic sense, posture, motor skill, proprioception  to assist with LE, proximal hip, and core control in self care, mobility, lifting, ambulation and eccentric single leg control. NMR and Therapeutic Activities:    [x] (69447 or 66000) Provided verbal/tactile cueing for activities related to improving balance, coordination, kinesthetic sense, posture, motor skill, proprioception and motor activation to allow for proper function of core, proximal hip and LE with self care and ADLs  [] (87559) Gait Re-education- Provided training and instruction to the patient for proper LE, core and proximal hip recruitment and positioning and eccentric body weight control with ambulation re-education including up and down stairs     Home Exercise Program:    [x] (96429) Reviewed/Progressed HEP activities related to strengthening, flexibility, endurance, ROM of core, proximal hip and LE for functional self-care, mobility, lifting and ambulation/stair navigation   [] (98278)Reviewed/Progressed HEP activities related to improving balance, coordination, kinesthetic sense, posture, motor skill, proprioception of core, proximal hip and LE for self care, mobility, lifting, and ambulation/stair navigation      Manual Treatments:  PROM / STM / Oscillations-Mobs:  G-I, II, III, IV (PA's, Inf., Post.)  [x] (57572) Provided manual therapy to mobilize LE, proximal hip and/or LS spine soft tissue/joints for the purpose of modulating pain, promoting relaxation,  increasing ROM, reducing/eliminating soft tissue swelling/inflammation/restriction, improving soft tissue extensibility and allowing for proper ROM for normal function with self care, mobility, lifting and ambulation.       Instrument Assisted Soft Tissue Mobilization (IASTM): was applied to the following muscles: vastus lateralis, lateral hamstrings with Hawk  tools including HG2 (handle-bar) and HG9 (tongue

## 2018-12-04 ENCOUNTER — OFFICE VISIT (OUTPATIENT)
Dept: ORTHOPEDIC SURGERY | Age: 28
End: 2018-12-04
Payer: COMMERCIAL

## 2018-12-04 VITALS
WEIGHT: 125 LBS | DIASTOLIC BLOOD PRESSURE: 76 MMHG | HEIGHT: 66 IN | HEART RATE: 67 BPM | RESPIRATION RATE: 17 BRPM | BODY MASS INDEX: 20.09 KG/M2 | SYSTOLIC BLOOD PRESSURE: 121 MMHG

## 2018-12-04 DIAGNOSIS — M22.2X2 PATELLOFEMORAL PAIN SYNDROME OF LEFT KNEE: Primary | ICD-10-CM

## 2018-12-04 DIAGNOSIS — E88.89 HOFFA'S SYNDROME (HCC): ICD-10-CM

## 2018-12-04 PROCEDURE — 99213 OFFICE O/P EST LOW 20 MIN: CPT | Performed by: ORTHOPAEDIC SURGERY

## 2018-12-04 NOTE — PROGRESS NOTES
times daily      albuterol sulfate  (90 Base) MCG/ACT inhaler Inhale into the lungs every 6 hours as needed for Wheezing      Etonogestrel-Ethinyl Estradiol ([facility]) Place vaginally       No current facility-administered medications for this visit. No Known Allergies    Review of Systems:  Pertinent items are noted in HPI. 13 point review of systems from Patient History Form dated 10/2/18 and available in the patient's chart under the Media tab. Physical Examination:     Vital signs:   /76   Pulse 67   Resp 17   Ht 5' 5.98\" (1.676 m)   Wt 125 lb (56.7 kg)   BMI 20.19 kg/m²     General:  alert, appears stated age, cooperative and no distress   Gait:  Normal. The patient can bear weight on the injured extremity. Left Knee  Alignment:  neutral   ROM:  0 degrees extension to 130 degrees flexion   Bilateral knees   Crepitus:  patellar bilateral    Joint Tenderness:  mild lateral joint line   Effusion:   0 cc   Patellar excursion:  2 of 4 quadrants. bila    Patellar tilt test:  negative   Patellar facet tenderness:  positive medial. She states her pain is normally here   positive lateral   Trochlear tenderness:  negative   Patellar apprehension test:  negative   Lachman test:  negative   Right knee: negative   Anterior drawer test:  negative   Right knee: negative   Posterior drawer:   negative    Right knee: negative   Varus laxity at 30 degrees:  negative   Right knee: negative   Valgus laxity at 30 degrees:   negative   Right knee: negative   Donato's test:  positive   Right knee: negative   Abram's test:  positive   Right knee: negative   Quadriceps atrophy:  none   Positive duck walk. Positive Hoffa test and hyperextension test lateral.    There is not any cellulitis, lymphedema or cutaneous lesions noted in the lower extremities. Motor exam of the lower extremities show quadriceps, hamstrings, foot dorsiflexion and plantarflexion grossly intact.   Sensation to both feet

## 2018-12-06 ENCOUNTER — TELEPHONE (OUTPATIENT)
Dept: ORTHOPEDIC SURGERY | Age: 28
End: 2018-12-06

## 2018-12-06 NOTE — TELEPHONE ENCOUNTER
PATIENT NEEDS MRI ORDER FAXED TO [facility] ON [address], PATIENT CAN BE REACHED [phone], PLEASE CALL ONCE IT HAS BEEN FAXED

## 2018-12-17 ENCOUNTER — OFFICE VISIT (OUTPATIENT)
Dept: ORTHOPEDIC SURGERY | Age: 28
End: 2018-12-17
Payer: COMMERCIAL

## 2018-12-17 VITALS
SYSTOLIC BLOOD PRESSURE: 128 MMHG | BODY MASS INDEX: 20.09 KG/M2 | WEIGHT: 125 LBS | DIASTOLIC BLOOD PRESSURE: 81 MMHG | HEIGHT: 66 IN | RESPIRATION RATE: 16 BRPM

## 2018-12-17 DIAGNOSIS — M22.2X2 PATELLOFEMORAL PAIN SYNDROME OF LEFT KNEE: ICD-10-CM

## 2018-12-17 DIAGNOSIS — E88.89 HOFFA'S SYNDROME (HCC): Primary | ICD-10-CM

## 2018-12-17 PROCEDURE — 99213 OFFICE O/P EST LOW 20 MIN: CPT | Performed by: ORTHOPAEDIC SURGERY

## 2019-01-17 ENCOUNTER — HOSPITAL ENCOUNTER (OUTPATIENT)
Dept: PHYSICAL THERAPY | Age: 29
Setting detail: THERAPIES SERIES
Discharge: HOME | End: 2019-01-17
Payer: COMMERCIAL

## 2019-01-17 PROCEDURE — G8978 MOBILITY CURRENT STATUS: HCPCS | Performed by: PHYSICAL THERAPIST

## 2019-01-17 PROCEDURE — G8980 MOBILITY D/C STATUS: HCPCS | Performed by: PHYSICAL THERAPIST

## 2019-01-17 PROCEDURE — G8979 MOBILITY GOAL STATUS: HCPCS | Performed by: PHYSICAL THERAPIST

## 2019-01-17 NOTE — PLAN OF CARE
percent impaired, limited or restricted      Patient has not returned since 11/28/18. Therefore, being D/C at this time. Final Status Unknown.     Rach Acosta

## 2019-02-04 ENCOUNTER — OFFICE VISIT (OUTPATIENT)
Dept: ORTHOPEDIC SURGERY | Age: 29
End: 2019-02-04
Payer: COMMERCIAL

## 2019-02-04 VITALS
HEIGHT: 66 IN | WEIGHT: 125 LBS | BODY MASS INDEX: 20.09 KG/M2 | RESPIRATION RATE: 17 BRPM | DIASTOLIC BLOOD PRESSURE: 82 MMHG | HEART RATE: 82 BPM | SYSTOLIC BLOOD PRESSURE: 128 MMHG

## 2019-02-04 DIAGNOSIS — E88.89 HOFFA'S SYNDROME (HCC): Primary | ICD-10-CM

## 2019-02-04 DIAGNOSIS — M22.2X2 PATELLOFEMORAL PAIN SYNDROME OF LEFT KNEE: ICD-10-CM

## 2019-02-04 PROCEDURE — 99213 OFFICE O/P EST LOW 20 MIN: CPT | Performed by: ORTHOPAEDIC SURGERY
